# Patient Record
Sex: FEMALE | Race: ASIAN | NOT HISPANIC OR LATINO | URBAN - METROPOLITAN AREA
[De-identification: names, ages, dates, MRNs, and addresses within clinical notes are randomized per-mention and may not be internally consistent; named-entity substitution may affect disease eponyms.]

---

## 2017-09-04 ENCOUNTER — EMERGENCY (EMERGENCY)
Facility: HOSPITAL | Age: 77
LOS: 1 days | Discharge: HOME | End: 2017-09-04
Attending: EMERGENCY MEDICINE | Admitting: EMERGENCY MEDICINE
Payer: MEDICARE

## 2017-09-04 VITALS
OXYGEN SATURATION: 99 % | DIASTOLIC BLOOD PRESSURE: 76 MMHG | SYSTOLIC BLOOD PRESSURE: 174 MMHG | RESPIRATION RATE: 16 BRPM | HEART RATE: 82 BPM

## 2017-09-04 VITALS
DIASTOLIC BLOOD PRESSURE: 87 MMHG | OXYGEN SATURATION: 99 % | HEART RATE: 55 BPM | TEMPERATURE: 98 F | SYSTOLIC BLOOD PRESSURE: 155 MMHG | RESPIRATION RATE: 16 BRPM

## 2017-09-04 LAB
APPEARANCE UR: CLEAR — SIGNIFICANT CHANGE UP
BACTERIA # UR AUTO: SIGNIFICANT CHANGE UP
BILIRUB UR-MCNC: NEGATIVE — SIGNIFICANT CHANGE UP
BLOOD UR QL VISUAL: NEGATIVE — SIGNIFICANT CHANGE UP
COLOR SPEC: YELLOW — SIGNIFICANT CHANGE UP
GLUCOSE UR-MCNC: NEGATIVE — SIGNIFICANT CHANGE UP
KETONES UR-MCNC: NEGATIVE — SIGNIFICANT CHANGE UP
LEUKOCYTE ESTERASE UR-ACNC: NEGATIVE — SIGNIFICANT CHANGE UP
MUCOUS THREADS # UR AUTO: SIGNIFICANT CHANGE UP
NITRITE UR-MCNC: NEGATIVE — SIGNIFICANT CHANGE UP
PH UR: 7 — SIGNIFICANT CHANGE UP (ref 4.6–8)
PROT UR-MCNC: 10 — SIGNIFICANT CHANGE UP
RBC CASTS # UR COMP ASSIST: SIGNIFICANT CHANGE UP (ref 0–?)
SP GR SPEC: 1.02 — SIGNIFICANT CHANGE UP (ref 1–1.03)
SQUAMOUS # UR AUTO: SIGNIFICANT CHANGE UP
UROBILINOGEN FLD QL: NORMAL E.U. — SIGNIFICANT CHANGE UP (ref 0.1–0.2)
WBC UR QL: SIGNIFICANT CHANGE UP (ref 0–?)

## 2017-09-04 PROCEDURE — 99284 EMERGENCY DEPT VISIT MOD MDM: CPT

## 2017-09-04 RX ORDER — GABAPENTIN 400 MG/1
300 CAPSULE ORAL ONCE
Qty: 0 | Refills: 0 | Status: COMPLETED | OUTPATIENT
Start: 2017-09-04 | End: 2017-09-04

## 2017-09-04 RX ORDER — LIDOCAINE 4 G/100G
1 CREAM TOPICAL ONCE
Qty: 0 | Refills: 0 | Status: COMPLETED | OUTPATIENT
Start: 2017-09-04 | End: 2017-09-04

## 2017-09-04 RX ORDER — DIAZEPAM 5 MG
1 TABLET ORAL
Qty: 9 | Refills: 0 | OUTPATIENT
Start: 2017-09-04 | End: 2017-09-07

## 2017-09-04 RX ORDER — GABAPENTIN 400 MG/1
1 CAPSULE ORAL
Qty: 42 | Refills: 0 | OUTPATIENT
Start: 2017-09-04 | End: 2017-09-18

## 2017-09-04 RX ADMIN — LIDOCAINE 1 PATCH: 4 CREAM TOPICAL at 17:00

## 2017-09-04 RX ADMIN — GABAPENTIN 300 MILLIGRAM(S): 400 CAPSULE ORAL at 17:00

## 2017-09-04 NOTE — ED PROVIDER NOTE - ATTENDING CONTRIBUTION TO CARE
Dr. Pickett: I have personally seen and examined this patient at the bedside. I have fully participated in the care of this patient. I have reviewed all pertinent clinical information, including history, physical exam, plan and the Resident's note and agree except as noted. HPI above as by me. PE above as by me. Sciatica w/o warnign signs of cauda equina. Poss UTI. Valium gabapentin. Discussed need for outpt pain mgmt.

## 2017-09-04 NOTE — ED PROVIDER NOTE - MEDICAL DECISION MAKING DETAILS
65 y/o F with h/o CAD w/ stents, HTN, HLD, DM2, hypothyroid, chronic smoker presents with complaint of left lower back pain radiating to back of left lower extremity. Pain consistent with sciatica. No red flag symptoms. No midline spinous process tenderness. UA to r/o UTI, pain control

## 2017-09-04 NOTE — ED PROVIDER NOTE - PROGRESS NOTE DETAILS
Pain improved after valium and gabapentin. Walking without difficulty. Will refer patient to pain management.

## 2017-09-04 NOTE — ED ADULT TRIAGE NOTE - CHIEF COMPLAINT QUOTE
c/o pain to right foot since July, pt states that she was seen by PMD and given Percocet and Alleve without improvement of symptoms.  Pt states that she has also been feeling "burning " all over her body since July

## 2017-09-04 NOTE — ED PROVIDER NOTE - CARE PLAN
Principal Discharge DX:	Sciatica Principal Discharge DX:	Sciatica  Instructions for follow-up, activity and diet:	Seen in ED for left sided sciatica. Take motrin 600mg every 6h as needed for mild to moderate pain. Take Valium 5mg one tab every 8 hours as needed for severe pain. NEVER DRINK AND DRIVE ON VALIUM IT WILL CAUSE ACCIDENTS. Please continue all home medications as directed. See your regular doctor within 72 hours for follow-up care. Return to ER for new or worsening symptoms.

## 2017-09-04 NOTE — ED PROVIDER NOTE - PLAN OF CARE
Seen in ED for left sided sciatica. Take motrin 600mg every 6h as needed for mild to moderate pain. Take Valium 5mg one tab every 8 hours as needed for severe pain. NEVER DRINK AND DRIVE ON VALIUM IT WILL CAUSE ACCIDENTS. Please continue all home medications as directed. See your regular doctor within 72 hours for follow-up care. Return to ER for new or worsening symptoms.

## 2017-09-04 NOTE — ED PROVIDER NOTE - OBJECTIVE STATEMENT
75 y/o F with h/o htn, hld, hypothyroid presents with complaint of left lower back pain with radiation to the left buttock and back of left lower extremity. Pain has been ongoing for 1.5-2 months. Worsened with movement and improved with rest. Was prescribed aleve and percocet by PMD which do not provide relieve. Denies any numbness/weakness in lower extremities, bowel/bladder incontinence, fever/chills. No preceding trauma. No gait instability. Reports burning with urination last night. 77 y/o F with h/o htn, hld, hypothyroid presents with complaint of left lower back pain with radiation to the left buttock and back of left lower extremity. Pain has been ongoing for 1.5-2 months. Worsened with movement and improved with rest. Was prescribed aleve and percocet by PMD which do not provide relieve. Denies any numbness/weakness in lower extremities, bowel/bladder incontinence, fever/chills. No preceding trauma. No gait instability. Reports burning with urination last night.  14:17 Pickett att: 76F h/o htn, hld, hypothyroid c/o 77 y/o F with h/o htn, hld, hypothyroid presents with complaint of left lower back pain with radiation to the left buttock and back of left lower extremity. Pain has been ongoing for 1.5-2 months. Worsened with movement and improved with rest. Was prescribed aleve and percocet by PMD which do not provide relieve. Denies any numbness/weakness in lower extremities, bowel/bladder incontinence, fever/chills. No preceding trauma. No gait instability. Reports burning with urination last night.  14:17 [facility] att: 76F h/o htn, hld, hypothyroid c/o llbp rad lle x 2 mo. Since early July patient notes back pain, points to left buttock, rad to posterior aspect lle, worse with standing from recling position. Denies saddle anesthesia, urinary or fecal incontinence, ataxia, or falls. Denies trauma, mvc, or back surgery. Saw pcp, prescribed Percocet and Aleve without relief. Incidental report of dysuria.

## 2017-09-04 NOTE — ED PROVIDER NOTE - PHYSICAL EXAMINATION
Nieves att: nad, aaox3, neck supple nt, ctabl, rrr, s1s2, abd soft ntnd, neg spinal tenderness, rt buttock palpation elicits spasm, neg le edema, b/l str sens intact. Ambulates without assistance.

## 2017-09-06 LAB
BACTERIA UR CULT: SIGNIFICANT CHANGE UP
SPECIMEN SOURCE: SIGNIFICANT CHANGE UP